# Patient Record
Sex: MALE | Race: WHITE | NOT HISPANIC OR LATINO | URBAN - METROPOLITAN AREA
[De-identification: names, ages, dates, MRNs, and addresses within clinical notes are randomized per-mention and may not be internally consistent; named-entity substitution may affect disease eponyms.]

---

## 2023-12-14 ENCOUNTER — OFFICE VISIT (OUTPATIENT)
Dept: CARDIOLOGY | Facility: CLINIC | Age: 83
End: 2023-12-14
Payer: MEDICARE

## 2023-12-14 VITALS
SYSTOLIC BLOOD PRESSURE: 134 MMHG | WEIGHT: 197 LBS | BODY MASS INDEX: 28.2 KG/M2 | HEIGHT: 70 IN | HEART RATE: 63 BPM | DIASTOLIC BLOOD PRESSURE: 62 MMHG

## 2023-12-14 DIAGNOSIS — E11.9 TYPE 2 DIABETES MELLITUS WITHOUT COMPLICATION, WITHOUT LONG-TERM CURRENT USE OF INSULIN (MULTI): ICD-10-CM

## 2023-12-14 DIAGNOSIS — R07.89 CHEST DISCOMFORT: ICD-10-CM

## 2023-12-14 DIAGNOSIS — E78.2 MIXED HYPERLIPIDEMIA: ICD-10-CM

## 2023-12-14 PROBLEM — Z78.9 NEVER SMOKED CIGARETTES: Status: ACTIVE | Noted: 2023-12-14

## 2023-12-14 PROCEDURE — 99214 OFFICE O/P EST MOD 30 MIN: CPT | Performed by: INTERNAL MEDICINE

## 2023-12-14 PROCEDURE — 1036F TOBACCO NON-USER: CPT | Performed by: INTERNAL MEDICINE

## 2023-12-14 PROCEDURE — 1159F MED LIST DOCD IN RCRD: CPT | Performed by: INTERNAL MEDICINE

## 2023-12-14 RX ORDER — IPRATROPIUM BROMIDE 42 UG/1
2 SPRAY, METERED NASAL 4 TIMES DAILY
COMMUNITY
Start: 2023-09-08

## 2023-12-14 RX ORDER — METOPROLOL TARTRATE 25 MG/1
1 TABLET, FILM COATED ORAL 2 TIMES DAILY
COMMUNITY

## 2023-12-14 RX ORDER — NAPROXEN SODIUM 220 MG/1
1 TABLET, FILM COATED ORAL DAILY
COMMUNITY

## 2023-12-14 RX ORDER — NITROGLYCERIN 0.4 MG/1
TABLET SUBLINGUAL
COMMUNITY
End: 2024-05-09 | Stop reason: SDUPTHER

## 2023-12-14 RX ORDER — ATORVASTATIN CALCIUM 20 MG/1
20 TABLET, FILM COATED ORAL DAILY
COMMUNITY
Start: 2022-05-16

## 2023-12-14 RX ORDER — METFORMIN HYDROCHLORIDE 500 MG/1
500 TABLET, EXTENDED RELEASE ORAL
COMMUNITY
Start: 2023-11-23

## 2023-12-14 RX ORDER — BUDESONIDE 0.5 MG/2ML
INHALANT ORAL
COMMUNITY

## 2023-12-14 RX ORDER — OMEPRAZOLE 20 MG/1
20 CAPSULE, DELAYED RELEASE ORAL EVERY OTHER DAY
COMMUNITY

## 2023-12-14 NOTE — PROGRESS NOTES
Patient:  Neil Weeks  YOB: 1940  MRN: [id number]       HPI:       Neil Weeks is a 83 y.o. male who returns today for cardiac follow-up.  Patient is doing well.  He is not having any new unusual symptoms.  He is planning to spend the month of March in [place].  He will be spending the remaining part of the winter here in [place].  He claims no other medical issues or problems at this time.  Chest pain syndrome has resolved.      Objective:     There were no vitals filed for this visit.    Wt Readings from Last 4 Encounters:   05/15/23 91.9 kg (202 lb 8 oz)   12/13/22 90.9 kg (200 lb 5 oz)   06/14/22 90 kg (198 lb 8 oz)   12/14/21 89.9 kg (198 lb 2 oz)       Allergies:     Not on File     Medications:     No current outpatient medications    Physical Examination:     Constitutional:       Appearance: Healthy appearance. Not in distress.   Neck:      Vascular: No JVR. JVD normal.   Pulmonary:      Effort: Pulmonary effort is normal.      Breath sounds: Normal breath sounds. No wheezing. No rhonchi. No rales.   Chest:      Chest wall: Not tender to palpatation.   Cardiovascular:      PMI at left midclavicular line. Normal rate. Regular rhythm. Normal S1. Normal S2.       Murmurs: There is no murmur.      No gallop.  No click. No rub.   Pulses:     Intact distal pulses.   Edema:     Peripheral edema absent.   Abdominal:      General: Bowel sounds are normal.      Palpations: Abdomen is soft.      Tenderness: There is no abdominal tenderness.   Musculoskeletal: Normal range of motion.         General: No tenderness. Skin:     General: Skin is warm and dry.   Neurological:      General: No focal deficit present.      Mental Status: Alert and oriented to person, place and time.          Lab:           Diagnostic Studies:   MRN: [id number]  Patient Name: NEIL WEEKS     STUDY:  MYOCARDIAL PERFUSION STRESS TEST WITH EXERCISE     Performing facility:  [facility] Office Building,  [address],  [place] [address]  [facility] Provider: Jg Sequeira DO, [facility]  PCP:  Dr. JOY  Supervising provider: Ashley Mckenna MD, [facility]     INDICATION:  Chest Pain;     HISTORY:  Gender: M; Age: 81 y/o ; Height: 177.8 cm; Weight: 87.2184792 kg.     Chest Pain; Diabetes; Family HX CAD; High Cholesterol;     Denies smoking.        COMPARISON:  Previous nuclear testing completed on 2018 at [facility].        ACCESSION NUMBER(S):  [id number]; [id number]; [id number]     ORDERING CLINICIAN:  JG SEQUEIRA     TECHNIQUE:  ONE DAY protocol.  Stress injection: Date:6/2/2020, 33.8 mCi of Myoview IV at peak  exercise.  Rest injection: Date: 6/2/2020, 11.2 mCi of Myoview IV at rest.  Imaging was performed by gated tomographic technique.     STRESS TEST DATA:  Resting heart rate was 54 BPM.  Resting blood pressure was 148/84 mmHg.  The patient exercised using a Jaciel exercise protocol.  8:28 minutes exercised.  85 % of MPHR achieved for age.  10.1 METS achieved.  Maximum heart rate was 121 BPM.  Maximum blood pressure was 166/88 mmHg.  DTS 8.  TEST TERMINATED DUE TO: Fatigue     FINDINGS:  STRESS TEST RESULTS:     Resting electrocardiogram revealed sinus bradycardia.  The patient had no significant ECG changes with maximal stress.  The patient did not have chest pains/symptoms during the procedure.  There was a normal recovery phase.  There were no significant dysrhythmias.     IMAGING RESULTS:     Image quality was good.  Rest and stress tomographic images were reviewed and revealed normal  perfusion without evidence of ischemia, myocardial infarction, or  left ventricular dilatation with stress.  Overall left ventricular systolic function appeared to be normal  without regional wall motion abnormalities.  LV ejection fraction was 62 %.  TID is 0.89 and is normal.  There was evidence of mild diaphragmatic attenuation artifact.     IMPRESSION:  Normal exercise Myoview cardiac perfusion stress test.  No evidence of ischemia or  myocardial infarction by perfusion imaging.  Normal left ventricular systolic function, ejection fraction 62%.  No exercise provoked significant ischemic ECG changes or chest pain  symptoms.  When compared to prior study from February 2018, there has been no  significant changes.  Non invasive risk stratification is low risk..               [address], [address], [place] [address]           Tel [phone] Fax [phone]     TRANSTHORACIC ECHOCARDIOGRAM REPORT        Patient Name:     SEBASTIÁN Hay Physician:  [id number] Donald LOPEZ  Study Date:       6/2/2020     Referring           [id number] Donald Sequeira DO                                 Physician:  MRN/PID:          [id number]     PCP:                Debi Phelps MD  Accession/Order#: [id number]    Department          [facility]                                 Location:           [place]  YOB: 1940    Fellow:  Gender:           M            Nurse:  Admit Date:                    Sonographer:        Malika Connors RDCS, RDMS,                                                     RVT  Admission Status: Outpatient   Additional Staff:  Height:           177.80 cm    CC Report to:  Weight:           88.00 kg     Study Type:         Echocardiogram  BSA:              2.06 m2  Blood Pressure: 144 /84 mmHg     Diagnosis/ICD: R07.89-Other chest pain  Indication:    CP, DM  Procedure/CPT: Echo Complete w Full [id number]   Study Detail: The following Echo studies were performed: 2D, M-Mode, Doppler and                color flow.        PHYSICIAN INTERPRETATION:  Left Ventricle: The left ventricular systolic function is normal, with an estimated ejection fraction of 60%. There are no regional wall motion abnormalities. The left ventricular cavity size is normal. There is mild concentric left ventricular hypertrophy. Spectral Doppler shows an impaired relaxation pattern of  left ventricular diastolic filling.  LV Wall Scoring:  All segments are normal.     Left Atrium: The left atrium is mild to moderately dilated.  Right Ventricle: The right ventricle is normal in size. There is normal right ventricular global systolic function.  Right Atrium: The right atrium is normal in size.  Aortic Valve: The aortic valve appears structurally normal. The aortic valve appears tricuspid. There is mild aortic valve cusp calcification. There is no evidence of aortic valve stenosis.  There is mild aortic valve regurgitation. The peak instantaneous gradient of the aortic valve is 5.1 mmHg. The mean gradient of the aortic valve is 3.0 mmHg.  Mitral Valve: The mitral valve is normal in structure. There is no evidence of mitral valve stenosis. The doppler estimated mean and peak diastolic pressure gradients are 1.0 mmHg and 2.8 mmHg respectively. There is normal mitral valve leaflet mobility. There is trace mitral valve regurgitation.  Tricuspid Valve: The tricuspid valve is structurally normal. There is normal tricuspid valve leaflet mobility. There is mild tricuspid regurgitation.  Pulmonic Valve: The pulmonic valve is structurally normal. There is trace pulmonic valve regurgitation.  Pericardium: There is no pericardial effusion noted.  Aorta: The aortic root is normal. The aortic root appears mildly dilated and measures 3.80 cm. There is mild dilatation of the aortic root.  Pulmonary Artery: The tricuspid regurgitant velocity is 2.51 m/s, and with an estimated right atrial pressure of 5 mmHg, the estimated pulmonary artery pressure is mildly elevated with the RVSP at 24.9 mmHg.  Systemic Veins: The inferior vena cava appears to be of normal size.        CONCLUSIONS:   1. The left ventricular systolic function is normal with a 60% estimated ejection fraction.   2. Spectral Doppler shows an impaired relaxation pattern of left ventricular diastolic filling.   3. The left atrium is mild to moderately  dilated.   4. There is mitral stenosis is not seen.   5. Aortic valve stenosis is not present.   6. There is mild aortic valve regurgitation.   7. Mildly dilated aortic root.    Radiology:     No orders to display       Problem List:     There is no problem list on file for this patient.      Asessment:   83-year-old gentleman seen and evaluated in the office in routine follow-up.    Meds, vitals, examination as noted.    Chart reviewed in detail discussed the patient at length.    Impression:  Chest pain syndrome resolved  Hypertension controlled  Dyslipidemia  Obesity  Type 2 diabetes  Non-smoker      Plan:   Recommendation:  Continue usual meds  Patient will see me back in 6 months at which time studies will be performed including nuclear scan and echo  Call if any issue problems arise  Usual exercise recommendations and along with weight loss

## 2023-12-14 NOTE — PATIENT INSTRUCTIONS
Continue same medications/treatment.  Patient educated on proper medication use.  Patient educated on risk factor modification.  Please bring any lab results from other providers/physicians to your next appointment.    Please bring all medicines, vitamins, and herbal supplements with you when you come to the office.    Prescriptions will not be filled unless you are compliant with your follow up appointments or have a follow up appointment scheduled as per instruction of your physician. Refills should be requested at the time of your visit.    Follow up in 6 months  ECHO and Stress test to be done prior to office visit    IMORE RN, AM SCRIBING FOR AND IN THE PRESENCE OF DR. JG PLATA, DO, FACC

## 2024-04-25 ENCOUNTER — HOSPITAL ENCOUNTER (OUTPATIENT)
Dept: RADIOLOGY | Facility: CLINIC | Age: 84
Discharge: HOME | End: 2024-04-25
Payer: MEDICARE

## 2024-04-25 ENCOUNTER — HOSPITAL ENCOUNTER (OUTPATIENT)
Dept: CARDIOLOGY | Facility: CLINIC | Age: 84
Discharge: HOME | End: 2024-04-25
Payer: MEDICARE

## 2024-04-25 VITALS
WEIGHT: 197 LBS | BODY MASS INDEX: 28.2 KG/M2 | SYSTOLIC BLOOD PRESSURE: 140 MMHG | DIASTOLIC BLOOD PRESSURE: 65 MMHG | HEIGHT: 70 IN

## 2024-04-25 DIAGNOSIS — E78.2 MIXED HYPERLIPIDEMIA: ICD-10-CM

## 2024-04-25 DIAGNOSIS — R07.89 CHEST DISCOMFORT: ICD-10-CM

## 2024-04-25 LAB
AORTIC VALVE MEAN GRADIENT: 3 MMHG
AORTIC VALVE PEAK VELOCITY: 1.22 M/S
AV PEAK GRADIENT: 6 MMHG
AVA (PEAK VEL): 3.07 CM2
AVA (VTI): 3.42 CM2
EJECTION FRACTION APICAL 4 CHAMBER: 54.8
LEFT VENTRICLE INTERNAL DIMENSION DIASTOLE: 5.5 CM (ref 3.5–6)
LEFT VENTRICULAR OUTFLOW TRACT DIAMETER: 2.3 CM
LV EJECTION FRACTION BIPLANE: 57 %
MITRAL VALVE E/A RATIO: 0.7
MITRAL VALVE E/E' RATIO: 7
RIGHT VENTRICLE PEAK SYSTOLIC PRESSURE: 37.1 MMHG

## 2024-04-25 PROCEDURE — 3430000001 HC RX 343 DIAGNOSTIC RADIOPHARMACEUTICALS: Performed by: INTERNAL MEDICINE

## 2024-04-25 PROCEDURE — 93306 TTE W/DOPPLER COMPLETE: CPT | Performed by: INTERNAL MEDICINE

## 2024-04-25 PROCEDURE — 78452 HT MUSCLE IMAGE SPECT MULT: CPT

## 2024-04-25 PROCEDURE — A9502 TC99M TETROFOSMIN: HCPCS | Performed by: INTERNAL MEDICINE

## 2024-04-25 PROCEDURE — 93017 CV STRESS TEST TRACING ONLY: CPT

## 2024-04-25 PROCEDURE — 93306 TTE W/DOPPLER COMPLETE: CPT

## 2024-04-25 RX ADMIN — TETROFOSMIN 35.9 MILLICURIE: 0.23 INJECTION, POWDER, LYOPHILIZED, FOR SOLUTION INTRAVENOUS at 11:29

## 2024-04-25 RX ADMIN — TETROFOSMIN 11.7 MILLICURIE: 0.23 INJECTION, POWDER, LYOPHILIZED, FOR SOLUTION INTRAVENOUS at 10:15

## 2024-05-09 ENCOUNTER — OFFICE VISIT (OUTPATIENT)
Dept: CARDIOLOGY | Facility: CLINIC | Age: 84
End: 2024-05-09
Payer: MEDICARE

## 2024-05-09 VITALS
SYSTOLIC BLOOD PRESSURE: 124 MMHG | HEART RATE: 60 BPM | BODY MASS INDEX: 28.84 KG/M2 | WEIGHT: 201 LBS | DIASTOLIC BLOOD PRESSURE: 62 MMHG

## 2024-05-09 DIAGNOSIS — Z78.9 NEVER SMOKED CIGARETTES: ICD-10-CM

## 2024-05-09 DIAGNOSIS — E78.2 MIXED HYPERLIPIDEMIA: ICD-10-CM

## 2024-05-09 DIAGNOSIS — R07.89 CHEST DISCOMFORT: Primary | ICD-10-CM

## 2024-05-09 PROCEDURE — 1159F MED LIST DOCD IN RCRD: CPT | Performed by: INTERNAL MEDICINE

## 2024-05-09 PROCEDURE — 1157F ADVNC CARE PLAN IN RCRD: CPT | Performed by: INTERNAL MEDICINE

## 2024-05-09 PROCEDURE — 99214 OFFICE O/P EST MOD 30 MIN: CPT | Performed by: INTERNAL MEDICINE

## 2024-05-09 PROCEDURE — 1036F TOBACCO NON-USER: CPT | Performed by: INTERNAL MEDICINE

## 2024-05-09 RX ORDER — NITROGLYCERIN 0.4 MG/1
0.4 TABLET SUBLINGUAL EVERY 5 MIN PRN
Qty: 25 TABLET | Refills: 5 | Status: SHIPPED | OUTPATIENT
Start: 2024-05-09

## 2024-05-09 RX ORDER — KETOCONAZOLE 20 MG/G
1 CREAM TOPICAL DAILY
COMMUNITY
Start: 2024-04-17

## 2024-05-09 NOTE — PATIENT INSTRUCTIONS
Continue same medications/treatment.  Patient educated on proper medication use.  Patient educated on risk factor modification.  Please bring any lab results from other providers/physicians to your next appointment.    Please bring all medicines, vitamins, and herbal supplements with you when you come to the office.    Prescriptions will not be filled unless you are compliant with your follow up appointments or have a follow up appointment scheduled as per instruction of your physician. Refills should be requested at the time of your visit.    Follow up with Dr. Sequeira in 6 months    IHAKAN RN, AM SCRIBING FOR AND IN THE PRESENCE OF DR. JG SEQUEIRA, DO, FACC

## 2024-05-09 NOTE — PROGRESS NOTES
Patient:  Neil Stevens  YOB: 1940  MRN: [id number]       Chief Complaint/Active Symptoms:       Neil Stevens is a 84 y.o. male who returns today for cardiac follow-up.  Patient is doing well.  He denies chest pain or shortness of breath.  He did have recent cardiac studies including a nuclear stress test which was normal.  He also had an echo which was normal showing normal ejection fraction mild MR and TR.  He denies chest pain or shortness of breath.  He is ambulatory without any difficulties.      Objective:     Vitals:    05/09/24 1229   BP: 124/62   Pulse: 60       Vitals:    05/09/24 1229   Weight: 91.2 kg (201 lb)       Allergies:     Allergies   Allergen Reactions    Other Other    Penicillins Other    Sulfabenzamide Unknown     ? AS CHILD          Medications:     Current Outpatient Medications   Medication Instructions    aspirin 81 mg chewable tablet 1 tablet, oral, Daily    atorvastatin (LIPITOR) 20 mg, oral, Daily    budesonide (Pulmicort) 0.5 mg/2 mL nebulizer solution INHALE 1 VIAL VIA NEBULIZER ONCE DAILY    ipratropium (Atrovent) 42 mcg (0.06 %) nasal spray 2 sprays, Each Nostril, 4 times daily    ketoconazole (NIZOral) 2 % cream 1 Application, Topical, Daily    metFORMIN XR (GLUCOPHAGE-XR) 500 mg, oral, 2 times daily with meals    metoprolol tartrate (Lopressor) 25 mg tablet 1 tablet, oral, 2 times daily    nitroglycerin (Nitrostat) 0.4 mg SL tablet sublingual    omeprazole (PRILOSEC) 20 mg, oral, Every other day       Physical Examination:   Constitutional:       General: Awake.      Appearance: Normal and healthy appearance. Well-developed and not in distress.   Neck:      Vascular: No JVR. JVD normal.   Pulmonary:      Effort: Pulmonary effort is normal.      Breath sounds: Normal breath sounds. No wheezing. No rhonchi. No rales.   Chest:      Chest wall: Not tender to palpatation.   Cardiovascular:      PMI at left midclavicular line. Normal rate. Regular rhythm. Normal S1.  Normal S2.       Murmurs: There is no murmur.      No gallop.  No click. No rub.   Pulses:     Intact distal pulses.   Edema:     Peripheral edema absent.   Abdominal:      Tenderness: There is no abdominal tenderness.   Musculoskeletal: Normal range of motion.         General: No tenderness. Skin:     General: Skin is warm and dry.   Neurological:      General: No focal deficit present.      Mental Status: Alert and oriented to person, place and time.            Lab:         Diagnostic Studies:     Nuclear Stress Test    Result Date: 4/26/2024  Interpreted By:  Tino Phillips and Christo Dennis STUDY: MYOCARDIAL PERFUSION STRESS TEST WITH EXERCISE   Performing facility: [facility] Building, [address] [place] [address] [address], [place] [address] [facility] Provider:  Donald Sequeira DO, FACC PCP:  Dr. DONNA Phelps Supervising provider:  Donald Sequeira DO, FACC   INDICATION: Chest discomfort   HISTORY: Gender:  M; Age:  83 y/o ; Height:  .8 cm cm; Weight:   WT 89.359 kg kg.   High Cholesterol;  Diabetes;  Family HX CAD;  HTN;  Chest Pain; Denies smoking.     COMPARISON: Previous nuclear testing completed on2020 at [facility].     ACCESSION NUMBER(S): [id number]   ORDERING CLINICIAN: DONALD SEQUEIRA   TECHNIQUE: ONE DAY protocol. Stress injection: Date:4-25-24, 35.9 mCi of Myoview IV at peak exercise. Rest injection: Date: 4-25-24, 11.7 mCi of Myoview IV at rest. Imaging was performed by  gated tomographic technique.   STRESS TEST DATA: Resting heart rate was 56 BPM. Resting blood pressure was 172/80 mmHg. The patient exercised using a  Jaciel exercise protocol. 6:59 minutes exercised. 89 % of MPHR achieved for age. 8.50 METS achieved. Maximum heart rate was 123 BPM. Maximum blood pressure was 208/80 mmHg. DTS 7. TEST TERMINATED DUE TO:  Dyspnea   FINDINGS: STRESS TEST RESULTS:   Resting electrocardiogram revealed sinus bradycardia. The patient had no significant ECG changes with maximal stress. The patient did  not have chest pains/symptoms during the procedure. There was a normal recovery phase. There were no significant dysrhythmias.   IMAGING RESULTS:   Image quality was good. Rest and stress tomographic images were reviewed and revealed normal perfusion without evidence of ischemia, myocardial infarction, or left ventricular dilatation with stress. Overall left ventricular systolic function appeared to be normal without regional wall motion abnormalities. LV ejection fraction was 59 %. TID is 1.09 and is normal. There was evidence of diaphragmatic attenuation artifact.       Normal exercise Myoview cardiac perfusion stress test. No evidence of ischemia or myocardial infarction by perfusion imaging. Normal left ventricular systolic function, ejection fraction 59%. No exercise provoked significant ischemic ECG changes or chest pain symptoms. When compared to prior study from June 2020, there has been no significant changes. Non invasive risk stratification is low risk.     Signed by: Tino Phillips 4/26/2024 2:43 PM Dictation workstation:   [id number]    Transthoracic Echo (TTE) Complete    Result Date: 4/25/2024              [address], [address] [place] [address]          Tel [phone] Fax [phone] TRANSTHORACIC ECHOCARDIOGRAM REPORT  Patient Name:      SEBASTIÁN Hay Physician:    [id number] Sequeira DO Study Date:        4/25/2024            Ordering Provider:    Ace SEQUEIRA MRN/PID:           [id number]             Fellow: Accession#:        [id number]         Nurse: Date of Birth/Age: 1940 / 84 years Sonographer:          Donald Hughes Gender:            M                    Additional Staff: Height:            177.80 cm            Admit Date:           4/25/2024 Weight:            89.36 kg             Admission  Status:     Outpatient BSA / BMI:         2.07 m2 / 28.27      Department Location:  [facility]                    kg/m2                                      Julita Hinojosa Blood Pressure: 140 /65 mmHg Study Type:    TRANSTHORACIC ECHO (TTE) COMPLETE Diagnosis/ICD: Other chest pain-R07.89 Indication:    CP, HLD CPT Codes:     Echo Complete w Full Doppler-[id number] Patient History: Diabetes:          Yes Pertinent History: Chest Pain and Hyperlipidemia. Study Detail: The following Echo studies were performed: 2D, M-Mode, Doppler and               color flow. The patient was awake.  PHYSICIAN INTERPRETATION: Left Ventricle: Left ventricular systolic function is normal, with an estimated ejection fraction of 55-60%. There are no regional wall motion abnormalities. The left ventricular cavity size is normal. There is mild concentric left ventricular hypertrophy. Spectral Doppler shows an impaired relaxation pattern of left ventricular diastolic filling. LV Wall Scoring: All segments are normal. Left Atrium: The left atrium is mildly dilated. Right Ventricle: The right ventricle is mildly enlarged. There is normal right ventricular global systolic function. Right Atrium: The right atrium is upper limits of normal in size. Aortic Valve: The aortic valve appears structurally normal. The aortic valve appears tricuspid. There is mild to moderate aortic valve cusp calcification. There is no evidence of aortic valve stenosis. There is no evidence of aortic valve regurgitation. The peak instantaneous gradient of the aortic valve is 6.0 mmHg. The mean gradient of the aortic valve is 3.0 mmHg. Mitral Valve: The mitral valve is normal in structure. There is no evidence of mitral valve stenosis. The doppler estimated mean and peak diastolic pressure gradients are 1.0 mmHg and 3.6 mmHg respectively. There is normal mitral valve leaflet mobility. There is mild mitral valve regurgitation. Tricuspid Valve: The tricuspid valve is  structurally normal. There is normal tricuspid valve leaflet mobility. There is mild tricuspid regurgitation. Pulmonic Valve: The pulmonic valve is structurally normal. There is trace pulmonic valve regurgitation. Pericardium: There is no pericardial effusion noted. Aorta: The aortic root is normal. Pulmonary Artery: The tricuspid regurgitant velocity is 2.92 m/s, and with an estimated right atrial pressure of 3 mmHg, the estimated pulmonary artery pressure is moderately elevated with the RVSP at 37.1 mmHg. Systemic Veins: The inferior vena cava appears to be of normal size. In comparison to the previous echocardiogram(s): The left ventricular function is unchanged. The left ventricular hypertrophy is unchanged. The left ventricular diastolic function is unchanged.  CONCLUSIONS:  1. Left ventricular systolic function is normal with a 55-60% estimated ejection fraction.  2. Spectral Doppler shows an impaired relaxation pattern of left ventricular diastolic filling.  3. There is no evidence of mitral valve stenosis.  4. Mild mitral valve regurgitation.  5. Mild tricuspid regurgitation is visualized.  6. Aortic valve stenosis is not present.  7. Moderately elevated pulmonary artery pressure. QUANTITATIVE DATA SUMMARY: 2D MEASUREMENTS:                           Normal Ranges: Ao Root d:     3.40 cm    (2.0-3.7cm) LAs:           4.40 cm    (2.7-4.0cm) RVIDd:         3.60 cm    (0.9-3.6cm) IVSd:          1.20 cm    (0.6-1.1cm) LVPWd:         1.20 cm    (0.6-1.1cm) LVIDd:         5.50 cm    (3.9-5.9cm) LVIDs:         3.50 cm LV Mass Index: 131.3 g/m2 LV % FS        36.4 % LA VOLUME:                             Normal Ranges: LA Area A4C:     22.8 cm2 LA Area A2C:     23.9 cm2 LA Volume Index: 35.3 ml/m2 LA Vol A4C:      70.0 ml LA Vol A2C:      77.0 ml LA Vol BP:       73.0 ml RA VOLUME BY A/L METHOD:                               Normal Ranges: RA Vol A4C:        64.2 ml    (8.3-19.5ml) RA Vol Index A4C:  31.0 ml/m2 RA  Area A4C:       20.2 cm2 RA Major Axis A4C: 5.4 cm AORTA MEASUREMENTS:                    Normal Ranges: Asc Ao, d: 3.10 cm (2.1-3.4cm) LV SYSTOLIC FUNCTION BY 2D PLANIMETRY (MOD):                     Normal Ranges: EF-A4C View: 54.8 % (>=55%) EF-A2C View: 59.5 % EF-Biplane:  57.5 % LV DIASTOLIC FUNCTION:                               Normal Ranges: MV Peak E:        0.72 m/s    (0.7-1.2 m/s) MV Peak A:        1.03 m/s    (0.42-0.7 m/s) E/A Ratio:        0.70        (1.0-2.2) MV lateral e'     0.10 m/s MV medial e'      0.08 m/s MV A Dur:         173.00 msec E/e' Ratio:       7.00        (<8.0) PulmV Sys Fabio:    72.30 cm/s PulmV Abrams Fabio:   39.80 cm/s PulmV S/D Fabio:    1.80 PulmV A Revs Fabio: 36.40 cm/s PulmV A Revs Dur: 120.00 msec MITRAL VALVE:                      Normal Ranges: MV Vmax:    0.95 m/s (<=1.3m/s) MV peak PG: 3.6 mmHg (<5mmHg) MV mean P.0 mmHg (<48mmHg) MV DT:      282 msec (150-240msec) AORTIC VALVE:                                   Normal Ranges: AoV Vmax:                1.22 m/s (<=1.7m/s) AoV Peak P.0 mmHg (<20mmHg) AoV Mean PG:             3.0 mmHg (1.7-11.5mmHg) LVOT Max Fabio:            0.90 m/s (<=1.1m/s) AoV VTI:                 28.40 cm (18-25cm) LVOT VTI:                23.40 cm LVOT Diameter:           2.30 cm  (1.8-2.4cm) AoV Area, VTI:           3.42 cm2 (2.5-5.5cm2) AoV Area,Vmax:           3.07 cm2 (2.5-4.5cm2) AoV Dimensionless Index: 0.82 AORTIC INSUFFICIENCY: AI Vmax:       3.98 m/s AI Half-time:  688 msec AI Decel Rate: 183.50 cm/s2  RIGHT VENTRICLE: RV Basal 3.90 cm RV Mid   3.00 cm RV Major 6.3 cm TRICUSPID VALVE/RVSP:                             Normal Ranges: Peak TR Velocity: 2.92 m/s Est. RA Pressure: 3 mmHg RV Syst Pressure: 37.1 mmHg (< 30mmHg) PULMONIC VALVE:                         Normal Ranges: PV Accel Time: 88 msec  (>120ms) PV Max Fabio:    0.9 m/s  (0.6-0.9m/s) PV Max PG:     3.2 mmHg Pulmonary Veins: PulmV A Revs Dur: 120.00 msec PulmV A Revs Fabio:  "36.40 cm/s PulmV Abrams Fabio:   39.80 cm/s PulmV S/D Fabio:    1.80 PulmV Sys Fabio:    72.30 cm/s  [id number] Donald Sequeira  Electronically signed on 4/25/2024 at 4:24:11 PM  Wall Scoring  ** Final **       EKG:   No results found for: \"EKG\"      Radiology:     No orders to display       Assessment/Plan:         Patient Active Problem List   Diagnosis    Chest discomfort    Diabetes mellitus (Multi)    Mixed hyperlipidemia    BMI 28.0-28.9,adult    Never smoked cigarettes         ASSESSMENT     Diagnoses and all orders for this visit:  Chest discomfort  Mixed hyperlipidemia  BMI 28.0-28.9,adult  Never smoked cigarettes    85-year-old gentleman here for routine cardiac follow-up and test results.    Meds, vitals, examination as noted.    Chart reviewed in detail discussed with the patient at length including test results and implications.    Impression:  Chest pain syndrome resolved  Normal stress test and perfusion scan  Normal echocardiogram with only mild valvular regurgitation  Dyslipidemia  Diabetes type 2  Non-smoker  Mildly overweight  PLAN     Recommendation:  Continue usual meds  See me in 6 months or so  Call if any issues or problems arise  Exercise and activity as tolerated              "

## 2024-12-05 ENCOUNTER — APPOINTMENT (OUTPATIENT)
Dept: CARDIOLOGY | Facility: CLINIC | Age: 84
End: 2024-12-05
Payer: MEDICARE

## 2024-12-05 VITALS
HEART RATE: 82 BPM | SYSTOLIC BLOOD PRESSURE: 120 MMHG | HEIGHT: 71 IN | DIASTOLIC BLOOD PRESSURE: 66 MMHG | BODY MASS INDEX: 28.6 KG/M2 | WEIGHT: 204.3 LBS

## 2024-12-05 DIAGNOSIS — R07.89 CHEST DISCOMFORT: ICD-10-CM

## 2024-12-05 DIAGNOSIS — E78.2 MIXED HYPERLIPIDEMIA: ICD-10-CM

## 2024-12-05 DIAGNOSIS — Z78.9 NEVER SMOKED CIGARETTES: ICD-10-CM

## 2024-12-05 DIAGNOSIS — E11.9 TYPE 2 DIABETES MELLITUS WITHOUT COMPLICATION, WITHOUT LONG-TERM CURRENT USE OF INSULIN (MULTI): ICD-10-CM

## 2024-12-05 PROCEDURE — 1157F ADVNC CARE PLAN IN RCRD: CPT | Performed by: INTERNAL MEDICINE

## 2024-12-05 PROCEDURE — 1036F TOBACCO NON-USER: CPT | Performed by: INTERNAL MEDICINE

## 2024-12-05 PROCEDURE — 1159F MED LIST DOCD IN RCRD: CPT | Performed by: INTERNAL MEDICINE

## 2024-12-05 PROCEDURE — 99214 OFFICE O/P EST MOD 30 MIN: CPT | Performed by: INTERNAL MEDICINE

## 2024-12-05 RX ORDER — TAMSULOSIN HYDROCHLORIDE 0.4 MG/1
0.4 CAPSULE ORAL
COMMUNITY

## 2024-12-05 NOTE — PATIENT INSTRUCTIONS
Continue same medications/treatment.  Patient educated on proper medication use.  Patient educated on risk factor modification.  Please bring any lab results from other providers/physicians to your next appointment.    Please bring all medicines, vitamins, and herbal supplements with you when you come to the office.    Prescriptions will not be filled unless you are compliant with your follow up appointments or have a follow up appointment scheduled as per instruction of your physician. Refills should be requested at the time of your visit.    Follow up in 6 months    I, MORE RODRIGUEZ RN, AM SCRIBING FOR AND IN THE PRESENCE OF DR. JG PLATA, DO, FACC

## 2024-12-05 NOTE — PROGRESS NOTES
Patient:  Neil Stevens  YOB: 1940  MRN: [id number]       Chief Complaint/Active Symptoms:       Neil Stevens is a 84 y.o. male who returns today for cardiac follow-up.  Patient denies any chest pain or shortness of breath.  Had his cardiac studies done earlier in year which were stable.  He is on his usual meds without difficulties.  He is ambulatory.  His vitals are stable.  His exam is unchanged.      Objective:     Vitals:    12/05/24 1227   BP: 120/66   Pulse: 82       Vitals:    12/05/24 1227   Weight: 92.7 kg (204 lb 4.8 oz)       Allergies:     Allergies   Allergen Reactions    Other Other    Penicillins Other    Sulfabenzamide Unknown     ? AS CHILD          Medications:     Current Outpatient Medications   Medication Instructions    aspirin 81 mg chewable tablet 1 tablet, Daily    atorvastatin (LIPITOR) 20 mg, Daily    budesonide (Pulmicort) 0.5 mg/2 mL nebulizer solution INHALE 1 VIAL VIA NEBULIZER ONCE DAILY    ipratropium (Atrovent) 42 mcg (0.06 %) nasal spray 2 sprays, 4 times daily    ketoconazole (NIZOral) 2 % cream 1 Application, Daily    metFORMIN XR (GLUCOPHAGE-XR) 500 mg, 2 times daily (morning and late afternoon)    metoprolol tartrate (Lopressor) 25 mg tablet 1 tablet, 2 times daily    nitroglycerin (NITROSTAT) 0.4 mg, sublingual, Every 5 min PRN    omeprazole (PRILOSEC) 20 mg, Every other day    tamsulosin (FLOMAX) 0.4 mg       Physical Examination:   Constitutional:       Appearance: Healthy appearance. Not in distress.   Neck:      Vascular: No JVR. JVD normal.   Pulmonary:      Effort: Pulmonary effort is normal.      Breath sounds: Normal breath sounds. No wheezing. No rhonchi. No rales.   Chest:      Chest wall: Not tender to palpatation.   Cardiovascular:      PMI at left midclavicular line. Normal rate. Regular rhythm. Normal S1. Normal S2.       Murmurs: There is a grade 1/6 systolic murmur at the URSB and ULSB.      No gallop.  No click. No rub.   Pulses:     Intact  distal pulses.   Edema:     Peripheral edema absent.   Abdominal:      General: Bowel sounds are normal.      Palpations: Abdomen is soft.      Tenderness: There is no abdominal tenderness.   Musculoskeletal: Normal range of motion.         General: No tenderness. Skin:     General: Skin is warm and dry.   Neurological:      General: No focal deficit present.      Mental Status: Alert and oriented to person, place and time.            Lab:           Diagnostic Studies:     Nuclear Stress Test    Result Date: 4/26/2024  Interpreted By:  Tino Phillips and Christo Dennis STUDY: MYOCARDIAL PERFUSION STRESS TEST WITH EXERCISE   Performing facility: [facility], [facility] Office Building, [address] [place] [address] [id number], [place] [address] Provider:  Donald Sequeira DO, FACC PCP:  Dr. DONNA Phelps Supervising provider:  Donald Sequeira DO, FACC   INDICATION: Chest discomfort   HISTORY: Gender:  M; Age:  85 y/o ; Height:  .8 cm cm; Weight:   WT 89.359 kg kg.   High Cholesterol;  Diabetes;  Family HX CAD;  HTN;  Chest Pain; Denies smoking.     COMPARISON: Previous nuclear testing completed on2020 at [facility].     ACCESSION NUMBER(S): [id number]   ORDERING CLINICIAN: DONALD SEQUEIRA   TECHNIQUE: ONE DAY protocol. Stress injection: Date:4-25-24, 35.9 mCi of Myoview IV at peak exercise. Rest injection: Date: 4-25-24, 11.7 mCi of Myoview IV at rest. Imaging was performed by  gated tomographic technique.   STRESS TEST DATA: Resting heart rate was 56 BPM. Resting blood pressure was 172/80 mmHg. The patient exercised using a  Jaciel exercise protocol. 6:59 minutes exercised. 89 % of MPHR achieved for age. 8.50 METS achieved. Maximum heart rate was 123 BPM. Maximum blood pressure was 208/80 mmHg. DTS 7. TEST TERMINATED DUE TO:  Dyspnea   FINDINGS: STRESS TEST RESULTS:   Resting electrocardiogram revealed sinus bradycardia. The patient had no significant ECG changes with maximal stress. The patient did not have chest  pains/symptoms during the procedure. There was a normal recovery phase. There were no significant dysrhythmias.   IMAGING RESULTS:   Image quality was good. Rest and stress tomographic images were reviewed and revealed normal perfusion without evidence of ischemia, myocardial infarction, or left ventricular dilatation with stress. Overall left ventricular systolic function appeared to be normal without regional wall motion abnormalities. LV ejection fraction was 59 %. TID is 1.09 and is normal. There was evidence of diaphragmatic attenuation artifact.       Normal exercise Myoview cardiac perfusion stress test. No evidence of ischemia or myocardial infarction by perfusion imaging. Normal left ventricular systolic function, ejection fraction 59%. No exercise provoked significant ischemic ECG changes or chest pain symptoms. When compared to prior study from June 2020, there has been no significant changes. Non invasive risk stratification is low risk.     Signed by: Tino Phillips 4/26/2024 2:43 PM Dictation workstation:   [id number]    Transthoracic Echo (TTE) Complete    Result Date: 4/25/2024              [address], [address], [place] [address]          Tel [phone] Fax [phone] TRANSTHORACIC ECHOCARDIOGRAM REPORT  Patient Name:      SEBASTIÁN Hay Physician:    [id number] Jg Sequeira DO Study Date:        4/25/2024            Ordering Provider:    [id number] JG SEQUEIRA MRN/PID:           [id number]             Fellow: Accession#:        [id number]         Nurse: Date of Birth/Age: 1940 / 84 years Sonographer:          Jg Hughes Gender:            M                    Additional Staff: Height:            177.80 cm            Admit Date:           4/25/2024 Weight:            89.36 kg             Admission Status:      Outpatient BSA / BMI:         2.07 m2 / 28.27      Department Location:  [facility] Heart                    kg/m2                                      Julita Hinojosa Blood Pressure: 140 /65 mmHg Study Type:    TRANSTHORACIC ECHO (TTE) COMPLETE Diagnosis/ICD: Other chest pain-R07.89 Indication:    CP, HLD CPT Codes:     Echo Complete w Full [id number] Patient History: Diabetes:          Yes Pertinent History: Chest Pain and Hyperlipidemia. Study Detail: The following Echo studies were performed: 2D, M-Mode, Doppler and               color flow. The patient was awake.  PHYSICIAN INTERPRETATION: Left Ventricle: Left ventricular systolic function is normal, with an estimated ejection fraction of 55-60%. There are no regional wall motion abnormalities. The left ventricular cavity size is normal. There is mild concentric left ventricular hypertrophy. Spectral Doppler shows an impaired relaxation pattern of left ventricular diastolic filling. LV Wall Scoring: All segments are normal. Left Atrium: The left atrium is mildly dilated. Right Ventricle: The right ventricle is mildly enlarged. There is normal right ventricular global systolic function. Right Atrium: The right atrium is upper limits of normal in size. Aortic Valve: The aortic valve appears structurally normal. The aortic valve appears tricuspid. There is mild to moderate aortic valve cusp calcification. There is no evidence of aortic valve stenosis. There is no evidence of aortic valve regurgitation. The peak instantaneous gradient of the aortic valve is 6.0 mmHg. The mean gradient of the aortic valve is 3.0 mmHg. Mitral Valve: The mitral valve is normal in structure. There is no evidence of mitral valve stenosis. The doppler estimated mean and peak diastolic pressure gradients are 1.0 mmHg and 3.6 mmHg respectively. There is normal mitral valve leaflet mobility. There is mild mitral valve regurgitation. Tricuspid Valve: The tricuspid valve is structurally  normal. There is normal tricuspid valve leaflet mobility. There is mild tricuspid regurgitation. Pulmonic Valve: The pulmonic valve is structurally normal. There is trace pulmonic valve regurgitation. Pericardium: There is no pericardial effusion noted. Aorta: The aortic root is normal. Pulmonary Artery: The tricuspid regurgitant velocity is 2.92 m/s, and with an estimated right atrial pressure of 3 mmHg, the estimated pulmonary artery pressure is moderately elevated with the RVSP at 37.1 mmHg. Systemic Veins: The inferior vena cava appears to be of normal size. In comparison to the previous echocardiogram(s): The left ventricular function is unchanged. The left ventricular hypertrophy is unchanged. The left ventricular diastolic function is unchanged.  CONCLUSIONS:  1. Left ventricular systolic function is normal with a 55-60% estimated ejection fraction.  2. Spectral Doppler shows an impaired relaxation pattern of left ventricular diastolic filling.  3. There is no evidence of mitral valve stenosis.  4. Mild mitral valve regurgitation.  5. Mild tricuspid regurgitation is visualized.  6. Aortic valve stenosis is not present.  7. Moderately elevated pulmonary artery pressure. QUANTITATIVE DATA SUMMARY: 2D MEASUREMENTS:                           Normal Ranges: Ao Root d:     3.40 cm    (2.0-3.7cm) LAs:           4.40 cm    (2.7-4.0cm) RVIDd:         3.60 cm    (0.9-3.6cm) IVSd:          1.20 cm    (0.6-1.1cm) LVPWd:         1.20 cm    (0.6-1.1cm) LVIDd:         5.50 cm    (3.9-5.9cm) LVIDs:         3.50 cm LV Mass Index: 131.3 g/m2 LV % FS        36.4 % LA VOLUME:                             Normal Ranges: LA Area A4C:     22.8 cm2 LA Area A2C:     23.9 cm2 LA Volume Index: 35.3 ml/m2 LA Vol A4C:      70.0 ml LA Vol A2C:      77.0 ml LA Vol BP:       73.0 ml RA VOLUME BY A/L METHOD:                               Normal Ranges: RA Vol A4C:        64.2 ml    (8.3-19.5ml) RA Vol Index A4C:  31.0 ml/m2 RA Area A4C:        20.2 cm2 RA Major Axis A4C: 5.4 cm AORTA MEASUREMENTS:                    Normal Ranges: Asc Ao, d: 3.10 cm (2.1-3.4cm) LV SYSTOLIC FUNCTION BY 2D PLANIMETRY (MOD):                     Normal Ranges: EF-A4C View: 54.8 % (>=55%) EF-A2C View: 59.5 % EF-Biplane:  57.5 % LV DIASTOLIC FUNCTION:                               Normal Ranges: MV Peak E:        0.72 m/s    (0.7-1.2 m/s) MV Peak A:        1.03 m/s    (0.42-0.7 m/s) E/A Ratio:        0.70        (1.0-2.2) MV lateral e'     0.10 m/s MV medial e'      0.08 m/s MV A Dur:         173.00 msec E/e' Ratio:       7.00        (<8.0) PulmV Sys Fabio:    72.30 cm/s PulmV Abrams Fabio:   39.80 cm/s PulmV S/D Fabio:    1.80 PulmV A Revs Fabio: 36.40 cm/s PulmV A Revs Dur: 120.00 msec MITRAL VALVE:                      Normal Ranges: MV Vmax:    0.95 m/s (<=1.3m/s) MV peak PG: 3.6 mmHg (<5mmHg) MV mean P.0 mmHg (<48mmHg) MV DT:      282 msec (150-240msec) AORTIC VALVE:                                   Normal Ranges: AoV Vmax:                1.22 m/s (<=1.7m/s) AoV Peak P.0 mmHg (<20mmHg) AoV Mean PG:             3.0 mmHg (1.7-11.5mmHg) LVOT Max Fabio:            0.90 m/s (<=1.1m/s) AoV VTI:                 28.40 cm (18-25cm) LVOT VTI:                23.40 cm LVOT Diameter:           2.30 cm  (1.8-2.4cm) AoV Area, VTI:           3.42 cm2 (2.5-5.5cm2) AoV Area,Vmax:           3.07 cm2 (2.5-4.5cm2) AoV Dimensionless Index: 0.82 AORTIC INSUFFICIENCY: AI Vmax:       3.98 m/s AI Half-time:  688 msec AI Decel Rate: 183.50 cm/s2  RIGHT VENTRICLE: RV Basal 3.90 cm RV Mid   3.00 cm RV Major 6.3 cm TRICUSPID VALVE/RVSP:                             Normal Ranges: Peak TR Velocity: 2.92 m/s Est. RA Pressure: 3 mmHg RV Syst Pressure: 37.1 mmHg (< 30mmHg) PULMONIC VALVE:                         Normal Ranges: PV Accel Time: 88 msec  (>120ms) PV Max Fabio:    0.9 m/s  (0.6-0.9m/s) PV Max PG:     3.2 mmHg Pulmonary Veins: PulmV A Revs Dur: 120.00 msec PulmV A Revs Fabio: 36.40 cm/s  "PulmV Abrams Fabio:   39.80 cm/s PulmV S/D Fabio:    1.80 PulmV Sys Fabio:    72.30 cm/s  [id number] Donald Sequeira DO Electronically signed on 4/25/2024 at 4:24:11 PM  Wall Scoring  ** Final **       EKG:   No results found for: \"EKG\"      Radiology:     No orders to display       Assessment/Plan:         Patient Active Problem List   Diagnosis    Chest discomfort    Diabetes mellitus (Multi)    Mixed hyperlipidemia    BMI 28.0-28.9,adult    Never smoked cigarettes         ASSESSMENT       84-year-old gentleman here for routine cardiac follow-up.    Meds, vitals, examination as noted.    Chart reviewed in detail discussed with the patient at length.    Impression:  Chest pain syndrome  Normal cardiac function studies as listed above  Dyslipidemia  Type 2 diabetes  Mildly overweight  PLAN     Recommendation:  Maintain current meds  See me in 6 months  Call should any issues or problems otherwise did not              "

## 2025-06-05 ENCOUNTER — APPOINTMENT (OUTPATIENT)
Dept: CARDIOLOGY | Facility: CLINIC | Age: 85
End: 2025-06-05
Payer: MEDICARE

## 2025-06-05 VITALS
BODY MASS INDEX: 28 KG/M2 | WEIGHT: 200 LBS | SYSTOLIC BLOOD PRESSURE: 120 MMHG | HEART RATE: 65 BPM | HEIGHT: 71 IN | DIASTOLIC BLOOD PRESSURE: 60 MMHG

## 2025-06-05 DIAGNOSIS — E08.00 DIABETES MELLITUS DUE TO UNDERLYING CONDITION WITH HYPEROSMOLARITY WITHOUT COMA, WITHOUT LONG-TERM CURRENT USE OF INSULIN: ICD-10-CM

## 2025-06-05 DIAGNOSIS — Z78.9 NEVER SMOKED CIGARETTES: ICD-10-CM

## 2025-06-05 DIAGNOSIS — E78.2 MIXED HYPERLIPIDEMIA: ICD-10-CM

## 2025-06-05 PROCEDURE — 1157F ADVNC CARE PLAN IN RCRD: CPT | Performed by: INTERNAL MEDICINE

## 2025-06-05 PROCEDURE — 1159F MED LIST DOCD IN RCRD: CPT | Performed by: INTERNAL MEDICINE

## 2025-06-05 PROCEDURE — 99214 OFFICE O/P EST MOD 30 MIN: CPT | Performed by: INTERNAL MEDICINE

## 2025-06-05 PROCEDURE — 1036F TOBACCO NON-USER: CPT | Performed by: INTERNAL MEDICINE

## 2025-06-05 NOTE — PROGRESS NOTES
Patient:  Neil Stevens  YOB: 1940  MRN: [id number]     Chief complaint:   Chief Complaint   Patient presents with    Follow-up     Pt. Present today for 6 month follow up for Mixed hyperlipidemia        Chief Complaint/Active Symptoms:       Neil Stevens is a 85 y.o. male who returns today for cardiac follow-up.  Patient feeling well.  Patient denies any chest pain shortness of breath or other current complaints.  Is a first diabetic hypertensive hyperlipidemic and never been a smoker.  He is a retired teacher.  He has not had any ongoing chest pain or discomfort at this time.  He is on his usual medicines without difficulties.      Objective:     Vitals:    06/05/25 1223   BP: 120/60   Pulse: 65       Vitals:    06/05/25 1223   Weight: 90.7 kg (200 lb)       Allergies:     Allergies   Allergen Reactions    Other Other    Penicillins Other    Sulfabenzamide Unknown     ? AS CHILD          Medications:     Current Outpatient Medications   Medication Instructions    aspirin 81 mg chewable tablet 1 tablet, Daily    atorvastatin (LIPITOR) 20 mg, Daily    budesonide (Pulmicort) 0.5 mg/2 mL nebulizer solution INHALE 1 VIAL VIA NEBULIZER ONCE DAILY    ipratropium (Atrovent) 42 mcg (0.06 %) nasal spray 2 sprays, 4 times daily    ketoconazole (NIZOral) 2 % cream 1 Application, Daily    metFORMIN XR (GLUCOPHAGE-XR) 500 mg, 2 times daily (morning and late afternoon)    metoprolol tartrate (Lopressor) 25 mg tablet 1 tablet, 2 times daily    nitroglycerin (NITROSTAT) 0.4 mg, sublingual, Every 5 min PRN    omeprazole (PRILOSEC) 20 mg, Every other day    tamsulosin (FLOMAX) 0.4 mg       Physical Examination:   Constitutional:       Appearance: Healthy appearance.   Eyes:      Pupils: Pupils are equal, round, and reactive to light.   Pulmonary:      Effort: Pulmonary effort is normal.      Breath sounds: Normal breath sounds.   Cardiovascular:      PMI at left midclavicular line. Normal rate. Regular rhythm.       "Murmurs: There is no murmur.      No gallop.  No click. No rub.   Pulses:     Intact distal pulses.   Musculoskeletal: Normal range of motion.      Cervical back: Normal range of motion. Skin:     General: Skin is warm and dry.   Neurological:      General: No focal deficit present.      Mental Status: Alert and oriented to person, place and time.            Lab:     CBC:   No results found for: \"WBC\", \"RBC\", \"HGB\", \"HCT\", \"PLT\"     CMP:    No results found for: \"NA\", \"K\", \"CL\", \"CO2\", \"BUN\", \"CREATININE\", \"AGRATIO\", \"GLUCOSE\", \"GLU\", \"CALCIUM\"    Magnesium:    No results found for: \"MG\"    Lipid Profile:    No results found for: \"CHLPL\", \"TRIG\", \"HDL\", \"LDLCALC\", \"LDLDIRECT\"    TSH:    No results found for: \"TSH\"    BNP:   No results found for: \"BNP\"     PT/INR:    No results found for: \"PROTIME\", \"INR\"    HgBA1c:    Lab Results   Component Value Date    HGBA1C 5.8 (H) 06/03/2024       BMP:  No results found for: \"NA\", \"K\", \"CL\", \"CO2\", \"BUN\", \"CREATININE\", \"GLU\"    CBC:  No results found for: \"WBC\", \"RBC\", \"HGB\", \"HCT\", \"MCV\", \"MCH\", \"MCHC\", \"RDW\", \"PLT\", \"MPV\"    Cardiac Enzymes:    No results found for: \"TROPHS\"    Hepatic Function Panel:    No results found for: \"ALKPHOS\", \"ALT\", \"AST\", \"PROT\", \"BILITOT\", \"BILIDIR\"      Diagnostic Studies:     Nuclear Stress Test  Result Date: 4/26/2024  Interpreted By:  Tino Phillips and Christo Dennis STUDY: MYOCARDIAL PERFUSION STRESS TEST WITH EXERCISE   Performing facility: [facility] Office Building, [address] [place] [address] [address], [place] [address] [facility] Provider:  Donald Sequeira DO, FACC PCP:  Dr. DONNA Phelps Supervising provider:  Donald Sequeira DO, FACC   INDICATION: Chest discomfort   HISTORY: Gender:  M; Age:  85 y/o ; Height:  .8 cm cm; Weight:   WT 89.359 kg kg.   High Cholesterol;  Diabetes;  Family HX CAD;  HTN;  Chest Pain; Denies smoking.     COMPARISON: Previous nuclear testing completed on2020 at [facility].     ACCESSION NUMBER(S): [id number]   " ORDERING CLINICIAN: JG PLATA   TECHNIQUE: ONE DAY protocol. Stress injection: Date:4-25-24, 35.9 mCi of Myoview IV at peak exercise. Rest injection: Date: 4-25-24, 11.7 mCi of Myoview IV at rest. Imaging was performed by  gated tomographic technique.   STRESS TEST DATA: Resting heart rate was 56 BPM. Resting blood pressure was 172/80 mmHg. The patient exercised using a  Jaciel exercise protocol. 6:59 minutes exercised. 89 % of MPHR achieved for age. 8.50 METS achieved. Maximum heart rate was 123 BPM. Maximum blood pressure was 208/80 mmHg. DTS 7. TEST TERMINATED DUE TO:  Dyspnea   FINDINGS: STRESS TEST RESULTS:   Resting electrocardiogram revealed sinus bradycardia. The patient had no significant ECG changes with maximal stress. The patient did not have chest pains/symptoms during the procedure. There was a normal recovery phase. There were no significant dysrhythmias.   IMAGING RESULTS:   Image quality was good. Rest and stress tomographic images were reviewed and revealed normal perfusion without evidence of ischemia, myocardial infarction, or left ventricular dilatation with stress. Overall left ventricular systolic function appeared to be normal without regional wall motion abnormalities. LV ejection fraction was 59 %. TID is 1.09 and is normal. There was evidence of diaphragmatic attenuation artifact.       Normal exercise Myoview cardiac perfusion stress test. No evidence of ischemia or myocardial infarction by perfusion imaging. Normal left ventricular systolic function, ejection fraction 59%. No exercise provoked significant ischemic ECG changes or chest pain symptoms. When compared to prior study from June 2020, there has been no significant changes. Non invasive risk stratification is low risk.     Signed by: Tino Phillips 4/26/2024 2:43 PM Dictation workstation:   [id number]    Transthoracic Echo (TTE) Complete  Result Date: 4/25/2024              [facility] [address]  [place] [address]          Tel [phone] Fax [phone] TRANSTHORACIC ECHOCARDIOGRAM REPORT  Patient Name:      SEBASTIÁN WEEKS       Reading Physician:    [id number] Donald Plata DO Study Date:        4/25/2024            Ordering Provider:    Ace PLATA MRN/PID:           [id number]             Fellow: Accession#:        [id number]         Nurse: Date of Birth/Age: 1940 / 84 years Sonographer:          Donald Hughes Gender:            M                    Additional Staff: Height:            177.80 cm            Admit Date:           4/25/2024 Weight:            89.36 kg             Admission Status:     Outpatient BSA / BMI:         2.07 m2 / 28.27      Department Location:  [facility] Heart                    kg/m2                                      [facility] Blood Pressure: 140 /65 mmHg Study Type:    TRANSTHORACIC ECHO (TTE) COMPLETE Diagnosis/ICD: Other chest pain-R07.89 Indication:    CP, HLD CPT Codes:     Echo Complete w Full [id number] Patient History: Diabetes:          Yes Pertinent History: Chest Pain and Hyperlipidemia. Study Detail: The following Echo studies were performed: 2D, M-Mode, Doppler and               color flow. The patient was awake.  PHYSICIAN INTERPRETATION: Left Ventricle: Left ventricular systolic function is normal, with an estimated ejection fraction of 55-60%. There are no regional wall motion abnormalities. The left ventricular cavity size is normal. There is mild concentric left ventricular hypertrophy. Spectral Doppler shows an impaired relaxation pattern of left ventricular diastolic filling. LV Wall Scoring: All segments are normal. Left Atrium: The left atrium is mildly dilated. Right Ventricle: The right ventricle is mildly enlarged. There is normal right ventricular global systolic function. Right Atrium: The  right atrium is upper limits of normal in size. Aortic Valve: The aortic valve appears structurally normal. The aortic valve appears tricuspid. There is mild to moderate aortic valve cusp calcification. There is no evidence of aortic valve stenosis. There is no evidence of aortic valve regurgitation. The peak instantaneous gradient of the aortic valve is 6.0 mmHg. The mean gradient of the aortic valve is 3.0 mmHg. Mitral Valve: The mitral valve is normal in structure. There is no evidence of mitral valve stenosis. The doppler estimated mean and peak diastolic pressure gradients are 1.0 mmHg and 3.6 mmHg respectively. There is normal mitral valve leaflet mobility. There is mild mitral valve regurgitation. Tricuspid Valve: The tricuspid valve is structurally normal. There is normal tricuspid valve leaflet mobility. There is mild tricuspid regurgitation. Pulmonic Valve: The pulmonic valve is structurally normal. There is trace pulmonic valve regurgitation. Pericardium: There is no pericardial effusion noted. Aorta: The aortic root is normal. Pulmonary Artery: The tricuspid regurgitant velocity is 2.92 m/s, and with an estimated right atrial pressure of 3 mmHg, the estimated pulmonary artery pressure is moderately elevated with the RVSP at 37.1 mmHg. Systemic Veins: The inferior vena cava appears to be of normal size. In comparison to the previous echocardiogram(s): The left ventricular function is unchanged. The left ventricular hypertrophy is unchanged. The left ventricular diastolic function is unchanged.  CONCLUSIONS:  1. Left ventricular systolic function is normal with a 55-60% estimated ejection fraction.  2. Spectral Doppler shows an impaired relaxation pattern of left ventricular diastolic filling.  3. There is no evidence of mitral valve stenosis.  4. Mild mitral valve regurgitation.  5. Mild tricuspid regurgitation is visualized.  6. Aortic valve stenosis is not present.  7. Moderately elevated pulmonary  artery pressure. QUANTITATIVE DATA SUMMARY: 2D MEASUREMENTS:                           Normal Ranges: Ao Root d:     3.40 cm    (2.0-3.7cm) LAs:           4.40 cm    (2.7-4.0cm) RVIDd:         3.60 cm    (0.9-3.6cm) IVSd:          1.20 cm    (0.6-1.1cm) LVPWd:         1.20 cm    (0.6-1.1cm) LVIDd:         5.50 cm    (3.9-5.9cm) LVIDs:         3.50 cm LV Mass Index: 131.3 g/m2 LV % FS        36.4 % LA VOLUME:                             Normal Ranges: LA Area A4C:     22.8 cm2 LA Area A2C:     23.9 cm2 LA Volume Index: 35.3 ml/m2 LA Vol A4C:      70.0 ml LA Vol A2C:      77.0 ml LA Vol BP:       73.0 ml RA VOLUME BY A/L METHOD:                               Normal Ranges: RA Vol A4C:        64.2 ml    (8.3-19.5ml) RA Vol Index A4C:  31.0 ml/m2 RA Area A4C:       20.2 cm2 RA Major Axis A4C: 5.4 cm AORTA MEASUREMENTS:                    Normal Ranges: Asc Ao, d: 3.10 cm (2.1-3.4cm) LV SYSTOLIC FUNCTION BY 2D PLANIMETRY (MOD):                     Normal Ranges: EF-A4C View: 54.8 % (>=55%) EF-A2C View: 59.5 % EF-Biplane:  57.5 % LV DIASTOLIC FUNCTION:                               Normal Ranges: MV Peak E:        0.72 m/s    (0.7-1.2 m/s) MV Peak A:        1.03 m/s    (0.42-0.7 m/s) E/A Ratio:        0.70        (1.0-2.2) MV lateral e'     0.10 m/s MV medial e'      0.08 m/s MV A Dur:         173.00 msec E/e' Ratio:       7.00        (<8.0) PulmV Sys Fabio:    72.30 cm/s PulmV Abrams Fabio:   39.80 cm/s PulmV S/D Fabio:    1.80 PulmV A Revs Fabio: 36.40 cm/s PulmV A Revs Dur: 120.00 msec MITRAL VALVE:                      Normal Ranges: MV Vmax:    0.95 m/s (<=1.3m/s) MV peak PG: 3.6 mmHg (<5mmHg) MV mean P.0 mmHg (<48mmHg) MV DT:      282 msec (150-240msec) AORTIC VALVE:                                   Normal Ranges: AoV Vmax:                1.22 m/s (<=1.7m/s) AoV Peak P.0 mmHg (<20mmHg) AoV Mean PG:             3.0 mmHg (1.7-11.5mmHg) LVOT Max Fabio:            0.90 m/s (<=1.1m/s) AoV VTI:                  "28.40 cm (18-25cm) LVOT VTI:                23.40 cm LVOT Diameter:           2.30 cm  (1.8-2.4cm) AoV Area, VTI:           3.42 cm2 (2.5-5.5cm2) AoV Area,Vmax:           3.07 cm2 (2.5-4.5cm2) AoV Dimensionless Index: 0.82 AORTIC INSUFFICIENCY: AI Vmax:       3.98 m/s AI Half-time:  688 msec AI Decel Rate: 183.50 cm/s2  RIGHT VENTRICLE: RV Basal 3.90 cm RV Mid   3.00 cm RV Major 6.3 cm TRICUSPID VALVE/RVSP:                             Normal Ranges: Peak TR Velocity: 2.92 m/s Est. RA Pressure: 3 mmHg RV Syst Pressure: 37.1 mmHg (< 30mmHg) PULMONIC VALVE:                         Normal Ranges: PV Accel Time: 88 msec  (>120ms) PV Max Fabio:    0.9 m/s  (0.6-0.9m/s) PV Max PG:     3.2 mmHg Pulmonary Veins: PulmV A Revs Dur: 120.00 msec PulmV A Revs Fabio: 36.40 cm/s PulmV Abrams Fabio:   39.80 cm/s PulmV S/D Fabio:    1.80 PulmV Sys Fabio:    72.30 cm/s  [id number] Donald Sequeira DO Electronically signed on 4/25/2024 at 4:24:11 PM  Wall Scoring  ** Final **       EKG:   No results found for: \"EKG\"      Radiology:     No orders to display       Problem List:     Patient Active Problem List   Diagnosis    Chest discomfort    Diabetes mellitus (Multi)    Mixed hyperlipidemia    BMI 28.0-28.9,adult    Never smoked cigarettes         ASSESSMENT   85-year-old gentleman here for routine follow-up.    Meds, vitals, examination as noted.    Chart reviewed entails constipation at length.    Impression:    Problem List Items Addressed This Visit       Diabetes mellitus (Multi)    Mixed hyperlipidemia    BMI 28.0-28.9,adult    Never smoked cigarettes   Chest pain stable and/or resolved    PLAN   Recommendation:  Maintain current meds  Usual exercise and activity  See me in 6 months  Call if any issues or problems arise        Jaylen ORDOÑEZ LPN   am scribing for, and in the presence of Dr. Donald Sequeira DO   .    IDr. Donald DO   , personally performed the services described in the documentation as scribed by Jaylen Benton, " LPN   in my presence, and confirm it is both accurate and complete.

## 2025-12-04 ENCOUNTER — APPOINTMENT (OUTPATIENT)
Dept: CARDIOLOGY | Facility: CLINIC | Age: 85
End: 2025-12-04
Payer: MEDICARE